# Patient Record
Sex: FEMALE | Race: WHITE | NOT HISPANIC OR LATINO | URBAN - METROPOLITAN AREA
[De-identification: names, ages, dates, MRNs, and addresses within clinical notes are randomized per-mention and may not be internally consistent; named-entity substitution may affect disease eponyms.]

---

## 2018-06-28 ENCOUNTER — EMERGENCY (EMERGENCY)
Facility: HOSPITAL | Age: 28
LOS: 1 days | Discharge: HOME | End: 2018-06-28
Attending: PERSONAL EMERGENCY RESPONSE ATTENDANT
Payer: MEDICAID

## 2018-06-28 VITALS
OXYGEN SATURATION: 100 % | TEMPERATURE: 98 F | HEART RATE: 55 BPM | DIASTOLIC BLOOD PRESSURE: 65 MMHG | SYSTOLIC BLOOD PRESSURE: 100 MMHG | RESPIRATION RATE: 16 BRPM

## 2018-06-28 PROCEDURE — 93010 ELECTROCARDIOGRAM REPORT: CPT

## 2018-06-28 PROCEDURE — 99285 EMERGENCY DEPT VISIT HI MDM: CPT | Mod: 25

## 2018-06-28 PROCEDURE — 71046 X-RAY EXAM CHEST 2 VIEWS: CPT | Mod: 26

## 2018-06-28 RX ORDER — IBUPROFEN 200 MG
600 TABLET ORAL ONCE
Qty: 0 | Refills: 0 | Status: COMPLETED | OUTPATIENT
Start: 2018-06-28 | End: 2018-06-28

## 2018-06-28 NOTE — ED PROVIDER NOTE - MUSCULOSKELETAL, MLM
no midline spine tenderness, point tender R paraspinal muscle about T5/6, no other rib tenderness, pain increased with straight leg raise R side. 5/5 strength upper and lower extremities

## 2018-06-28 NOTE — ED PROVIDER NOTE - CARE PLAN
Principal Discharge DX:	Acute right-sided thoracic back pain Principal Discharge DX:	Acute right-sided thoracic back pain  Assessment and plan of treatment:	1. Stay hydrated. Ice 20mins on, 40 mins off for first 48hrs of onset of pain, after that heat in cycle: 20 mins on, 40 mins off. Avoid strenous activity, twisting and heavy lifting.  2. Take Ibuprofen 600mg every 6hrs for pain as needed- take with food. Alternate Motrin with Tylenol 1g every 6 hours.   3. Follow up with your PCP  24-48 hours. For continued pain you may also follow up with the spine center call [phone]  4. Return to ED for worsening of symptoms including fever, weakness, numbness, bowel/urine incontinence and all other concerns.

## 2018-06-28 NOTE — ED PROVIDER NOTE - PLAN OF CARE
1. Stay hydrated. Ice 20mins on, 40 mins off for first 48hrs of onset of pain, after that heat in cycle: 20 mins on, 40 mins off. Avoid strenous activity, twisting and heavy lifting.  2. Take Ibuprofen 600mg every 6hrs for pain as needed- take with food. Alternate Motrin with Tylenol 1g every 6 hours.   3. Follow up with your PCP  24-48 hours. For continued pain you may also follow up with the spine center call [phone]  4. Return to ED for worsening of symptoms including fever, weakness, numbness, bowel/urine incontinence and all other concerns.

## 2018-06-28 NOTE — ED ADULT NURSE NOTE - OBJECTIVE STATEMENT
27 year old female patient presents to ED c/o upper back pain since earlier this morning, worse with deep breathing and movement. Patient denies falls or trauma prior to noticing pain. States she lifts weights but denies recent strenuous activity and states it does not feel like any previous episodes of muscle soreness. Patient works as a  and states the pain worsened through out the day. Pt endorsing numbness to R scapula, without radiation to hand. Denies CP, SOB, dizziness or lightheadedness. Pt took neurontin 400mg PTA with some relief in pain. Family at bedside.

## 2018-06-28 NOTE — ED PROVIDER NOTE - ATTENDING CONTRIBUTION TO CARE
Attending MD Kay.  Agree with above.  PT is a 27 yr old female with pmhx of PT awoke this morning with pain in her mid upper back ~T5 which is point tender just lateral to spine on R side.  Pain worse with deep breaths and movement.  No trauma but excercises regularly.  Took flexeril at home which did improve sxs mildly.  No CP/nausea/recent fever/cough/illness.  No hx of leg swelling/clots.  Does take OCP's.  No hx of CA.  Pt is otherwise well appearing.  Lungs clear, heart sounds normal.  PT is not tachycardic.  PT is point tender T5/T6 paraspinal.  + straight R leg raise.  NO identifiable risk factors for PE. Attending MD Kay.  Agree with above.  PT is a 27 yr old female with pmhx of PT awoke this morning with pain in her mid upper back ~T5 which is point tender just lateral to spine on R side.  Pain worse with deep breaths and movement.  No trauma but excercises regularly.  Took flexeril at home which did improve sxs mildly.  No CP/nausea/recent fever/cough/illness.  No hx of leg swelling/clots.  Does take OCP's.  No hx of CA.  Pt is otherwise well appearing.  Lungs clear, heart sounds normal.  PT is not tachycardic.  PT is point tender T5/T6 paraspinal.  + straight R leg raise.  NO identifiable risk factors for PE.  D-dimer non-actionable, CXR & EKG non-actionable, pt reassured.  Stable for discharge.  Return to ED for any acutely new or worsening sxs margarito any development of chest pain, difficulty breathing or leg swelling.  None present currently.  Follow-up with PCP/ortho as needed for ongoing pain.

## 2018-06-28 NOTE — ED PROVIDER NOTE - MEDICAL DECISION MAKING DETAILS
Patient with no PMH p/w R paraspinal back pain. Pain reproduced on palpation, deep breaths and straight leg raise.  No injuries.  Wells negative, PERC does not apply as is on OCPs, however low suspicion. Low suspicion also for infection as no fever, cough. EKG, CXR, dimer, motrin, reassess.

## 2018-06-28 NOTE — ED PROVIDER NOTE - OBJECTIVE STATEMENT
Patient woke up this morning with back pain R side paraspinal about T5/6.  Thought it was due to yesterday's workout, took gabapentin that her dad had, which seemed to help somewhat.  Radiates to front. Worse with movement and deep breaths.  no falls, no fever/cough/nausea/vomiting/chest pain.  Nonsmoker, no alcohol or drug use.  Sexually active with one partner, no hx STIs.  LMP about 3 weeks ago.  no leg swelling, no immobilization, no hx blood clots or cancer.

## 2018-06-28 NOTE — ED ADULT NURSE NOTE - CHPI ED SYMPTOMS NEG
no difficulty bearing weight/no constipation/no neck tenderness/no fatigue/no motor function loss/no numbness

## 2018-06-29 LAB — D DIMER BLD IA.RAPID-MCNC: <150 NG/ML DDU — SIGNIFICANT CHANGE UP

## 2018-06-29 PROCEDURE — 85379 FIBRIN DEGRADATION QUANT: CPT

## 2018-06-29 PROCEDURE — 99283 EMERGENCY DEPT VISIT LOW MDM: CPT

## 2018-06-29 PROCEDURE — 71046 X-RAY EXAM CHEST 2 VIEWS: CPT

## 2018-06-29 PROCEDURE — 93005 ELECTROCARDIOGRAM TRACING: CPT

## 2018-06-29 RX ADMIN — Medication 600 MILLIGRAM(S): at 00:51

## 2019-11-23 ENCOUNTER — EMERGENCY (EMERGENCY)
Facility: HOSPITAL | Age: 29
LOS: 1 days | Discharge: HOME | End: 2019-11-23
Attending: EMERGENCY MEDICINE
Payer: COMMERCIAL

## 2019-11-23 VITALS
HEIGHT: 62 IN | TEMPERATURE: 98 F | HEART RATE: 56 BPM | OXYGEN SATURATION: 99 % | WEIGHT: 130.07 LBS | DIASTOLIC BLOOD PRESSURE: 76 MMHG | RESPIRATION RATE: 20 BRPM | SYSTOLIC BLOOD PRESSURE: 133 MMHG

## 2019-11-23 PROCEDURE — 99283 EMERGENCY DEPT VISIT LOW MDM: CPT

## 2019-11-23 RX ORDER — CYCLOBENZAPRINE HYDROCHLORIDE 10 MG/1
1 TABLET, FILM COATED ORAL
Qty: 6 | Refills: 0
Start: 2019-11-23 | End: 2019-11-24

## 2019-11-23 RX ORDER — IBUPROFEN 200 MG
400 TABLET ORAL ONCE
Refills: 0 | Status: COMPLETED | OUTPATIENT
Start: 2019-11-23 | End: 2019-11-23

## 2019-11-23 RX ORDER — DIAZEPAM 5 MG
5 TABLET ORAL ONCE
Refills: 0 | Status: DISCONTINUED | OUTPATIENT
Start: 2019-11-23 | End: 2019-11-23

## 2019-11-23 RX ORDER — LIDOCAINE 4 G/100G
1 CREAM TOPICAL ONCE
Refills: 0 | Status: COMPLETED | OUTPATIENT
Start: 2019-11-23 | End: 2019-11-23

## 2019-11-23 RX ADMIN — Medication 5 MILLIGRAM(S): at 18:37

## 2019-11-23 RX ADMIN — LIDOCAINE 1 PATCH: 4 CREAM TOPICAL at 18:36

## 2019-11-23 RX ADMIN — Medication 400 MILLIGRAM(S): at 18:37

## 2019-11-23 NOTE — ED PROVIDER NOTE - NS ED ROS FT
GENERAL: no fever, no chills  EYES: no change in vision, no irritation, no discharge, no redness, no pain  HEENT: no trouble swallowing or speaking, no throat pain, +neck pain  CARDIAC: no chest pain, no palpitations   PULMONARY: no cough, no shortness of breath, no wheezing  GI: no abdominal pain, no nausea, no vomiting, no diarrhea, no constipation  : no changes in urination, no dysuria  SKIN: no rashes  NEURO: no headache, no numbness, no weakness  MSK: no back pain, no calf pain     -Jorge Gagnon, PGY-1

## 2019-11-23 NOTE — ED PROVIDER NOTE - NSFOLLOWUPINSTRUCTIONS_ED_ALL_ED_FT
You were seen in the emergency department for acute neck pain. This is likely caused by a strain in the muscles of the neck, causing radiation of the pain. Take Tylenol 650mg (Two 325 mg pills) every 4-6 hours as needed for pain. Take Motrin 600 mg every 8 hours as needed for moderate pain -- take with food. You may apply a Lidoderm patch to the area for up to 12 hours - they can be purchased over the counter. You may take Flexeril as prescribed for severe muscle spasms or pain - do not operate heavy machinery while taking this medication as it can make you drowsy.    Rest, drink plenty of fluids.  Advance activity as tolerated.  Continue all previously prescribed medications as directed.  Follow up with your primary care physician in 48-72 hours- bring copies of your results.  Return to the ER for worsening or persistent symptoms, and/or ANY NEW OR CONCERNING SYMPTOMS. If you have issues obtaining follow up, please call: [phone] to obtain a doctor or specialist who takes your insurance in your area.

## 2019-11-23 NOTE — ED PROVIDER NOTE - PROGRESS NOTE DETAILS
Jorge Gagnon, PGY1: Patient seen and evaluated. Pain manageable. Plan to d/c w/ Flexeril script and PCP follow up. Discussed plan w/ patient and all questions answered, patient in agreement w/ plan. Patient given return precautions. VSS, NAD, CAOx3. Patient stable for d/c.

## 2019-11-23 NOTE — ED PROVIDER NOTE - PHYSICAL EXAMINATION
GENERAL: A&Ox3, non-toxic appearing, no acute distress  HEENT: NCAT, EOMI, oral mucosa moist, normal conjunctiva, neck supple w/ FROM  RESP: CTAB, no respiratory distress, no wheezes/rhonchi/rales, speaking in full sentences  CV: RRR, no murmurs/rubs/gallops  ABDOMEN: soft, non-tender, non-distended, no guarding  MSK: no visible deformities, right trapezius and occipital muscles TTP w/ hypertonicity  NEURO: no focal sensory or motor deficits, KOENIG, steady gait, SILT, 5/5 strength in all extremities  SKIN: warm, normal color, well perfused, no rash  PSYCH: normal affect    -Jorge Gagnon, PGY-1 GENERAL: A&Ox3, non-toxic appearing, no acute distress  HEENT: NCAT, EOMI, oral mucosa moist, normal conjunctiva, neck supple w/ FROM  RESP: CTAB, no respiratory distress, no wheezes/rhonchi/rales, speaking in full sentences  CV: RRR, no murmurs/rubs/gallops  ABDOMEN: soft, non-tender, non-distended, no guarding  MSK: no visible deformities, right trapezius and occipital muscles TTP w/ hypertonicity  NEURO: no focal sensory or motor deficits, KOENIG, steady gait, SILT, 5/5 strength in all extremities  SKIN: warm, normal color, well perfused, no rash  PSYCH: normal affect  -Jorge Gagnon, PGY-1        Attending note. Patient is alert and in mild to moderate distress. ENT examination is normal. There is no cervical or submandibular adenopathy. There is no midline cervical tenderness. Patient has tenderness in the right paracervical and right trapezius muscles. Symptoms are exacerbated by flexion, extension and combined extension and rotation. Her strength is 5/5 equal symmetrical. Sensation is intact and normal.

## 2019-11-23 NOTE — ED PROVIDER NOTE - OBJECTIVE STATEMENT
29 year old female no PMH p/w neck pain. Patient states she woke up yesterday w/ the pain, located on right side of neck, radiates into the back of her head. Exacerbated by forward neck flexion, mildly relieved w/ Tylenol. Similar hx for past 2 years, pain typically resolves on its own. Patient had MRI 2 years ago, dx w/ vertigo, not prescribed medications. Patient concerned since pain lasted 2 days. Patient is a heavy , unsure if she could have strained a muscle during workout. Denies recent trauma, no sick contacts. No fever, chills, headache, visual changes, jaw pain, chest pain, shortness of breath, weakness, or numbness/tingling. 29 year old female no PMH p/w neck pain. Patient states she woke up yesterday w/ the pain, located on right side of neck, radiates into the back of her head. Exacerbated by forward neck flexion, mildly relieved w/ Tylenol. Similar hx for past 2 years, pain typically resolves on its own. Patient had MRI 2 years ago, dx w/ vertigo, not prescribed medications. Patient concerned since pain lasted 2 days. Patient is a heavy , unsure if she could have strained a muscle during workout. Denies recent trauma, no sick contacts. No fever, chills, headache, visual changes, jaw pain, chest pain, shortness of breath, weakness, or numbness/tingling.       Attending note. Patient was seen in fast track room #2. We will do both. Patient is complaining of atraumatic right sided neck pain radiates from the shoulder to the right occipital area. She denies any fevers or chills or neurologic symptoms. She has no ENT complaints. She had similar episodes in the past which took last only a few hours. Pain is exacerbated by neck movement. She has no numbness or paresthesia.

## 2019-11-23 NOTE — ED PROVIDER NOTE - CLINICAL SUMMARY MEDICAL DECISION MAKING FREE TEXT BOX
29 year old female no PMH p/w right-sided neck pain upon waking yesterday, atraumatic, radiates into occiput, similar hx for past 2 years but usually remits w/ Tylenol. No red flag sx, possibly 2/2 heavy weightlifting. Right trapezius and occipital muscles TTP w/ hypertonicity, likely MSK causing pain. Neck supple, doubt meningitis. Plan for lidoderm patch, Valium, reassess. Can send w/ Flexeril script. Likely d/c home w/ PCP follow up. 29 year old female no PMH p/w right-sided neck pain upon waking yesterday, atraumatic, radiates into occiput, similar hx for past 2 years but usually remits w/ Tylenol. No red flag sx, possibly 2/2 heavy weightlifting. Right trapezius and occipital muscles TTP w/ hypertonicity, likely MSK causing pain. Neck supple, doubt meningitis. Plan for lidoderm patch, Valium, reassess. Can send w/ Flexeril script. Likely d/c home w/ PCP follow up.      Attending note. Atraumatic neck pain on the right side. NSAIDs, muscle relaxants, followup with spine center

## 2019-11-23 NOTE — ED ADULT NURSE NOTE - OBJECTIVE STATEMENT
pt ambulatory to room 2 c/o L sided neck pain pt ambulatory to room 2 c/o L sided neck pain worse with flexion  pt denies numbness or tingling. pt med as orderd.

## 2019-11-23 NOTE — ED PROVIDER NOTE - PATIENT PORTAL LINK FT
You can access the FollowMyHealth Patient Portal offered by [facility] by registering at the following website: [email or web address]. By joining [facility] [facility] [employer]’s FollowMyHealth portal, you will also be able to view your health information using other applications (apps) compatible with our system.

## 2019-11-23 NOTE — ED PROVIDER NOTE - ATTENDING CONTRIBUTION TO CARE
I performed a history and physical exam of the patient and discussed their management with the resident/ACP. I reviewed the resident/ACP's note and agree with the documented findings and plan of care.  attn =see MDM

## 2021-08-29 ENCOUNTER — EMERGENCY (EMERGENCY)
Facility: HOSPITAL | Age: 31
LOS: 1 days | Discharge: HOME | End: 2021-08-29
Attending: STUDENT IN AN ORGANIZED HEALTH CARE EDUCATION/TRAINING PROGRAM
Payer: MEDICAID

## 2021-08-29 VITALS
SYSTOLIC BLOOD PRESSURE: 124 MMHG | HEART RATE: 63 BPM | TEMPERATURE: 98 F | OXYGEN SATURATION: 98 % | WEIGHT: 138.89 LBS | HEIGHT: 62 IN | RESPIRATION RATE: 18 BRPM | DIASTOLIC BLOOD PRESSURE: 77 MMHG

## 2021-08-29 PROCEDURE — 72220 X-RAY EXAM SACRUM TAILBONE: CPT

## 2021-08-29 PROCEDURE — 99284 EMERGENCY DEPT VISIT MOD MDM: CPT

## 2021-08-29 PROCEDURE — 72220 X-RAY EXAM SACRUM TAILBONE: CPT | Mod: 26

## 2021-08-29 PROCEDURE — 72100 X-RAY EXAM L-S SPINE 2/3 VWS: CPT | Mod: 26

## 2021-08-29 PROCEDURE — 72170 X-RAY EXAM OF PELVIS: CPT | Mod: 26

## 2021-08-29 PROCEDURE — 99284 EMERGENCY DEPT VISIT MOD MDM: CPT | Mod: 25

## 2021-08-29 PROCEDURE — 72170 X-RAY EXAM OF PELVIS: CPT

## 2021-08-29 PROCEDURE — 72100 X-RAY EXAM L-S SPINE 2/3 VWS: CPT

## 2021-08-29 RX ORDER — IBUPROFEN 200 MG
600 TABLET ORAL ONCE
Refills: 0 | Status: COMPLETED | OUTPATIENT
Start: 2021-08-29 | End: 2021-08-29

## 2021-08-29 RX ORDER — OXYCODONE HYDROCHLORIDE 5 MG/1
5 TABLET ORAL ONCE
Refills: 0 | Status: DISCONTINUED | OUTPATIENT
Start: 2021-08-29 | End: 2021-08-29

## 2021-08-29 RX ORDER — OXYCODONE HYDROCHLORIDE 5 MG/1
1 TABLET ORAL
Qty: 8 | Refills: 0
Start: 2021-08-29 | End: 2021-08-30

## 2021-08-29 RX ORDER — ACETAMINOPHEN 500 MG
975 TABLET ORAL ONCE
Refills: 0 | Status: COMPLETED | OUTPATIENT
Start: 2021-08-29 | End: 2021-08-29

## 2021-08-29 RX ADMIN — Medication 600 MILLIGRAM(S): at 17:21

## 2021-08-29 RX ADMIN — Medication 975 MILLIGRAM(S): at 17:21

## 2021-08-29 RX ADMIN — OXYCODONE HYDROCHLORIDE 5 MILLIGRAM(S): 5 TABLET ORAL at 17:20

## 2021-08-29 NOTE — ED PROVIDER NOTE - CARE PROVIDER_API CALL
Km Lacy (MD)  Emergency Medicine; Family Medicine; Sports Medicine  [address] [place] [address]  Phone: [phone]  Fax: [phone]  Follow Up Time: 7-10 Days

## 2021-08-29 NOTE — ED PROVIDER NOTE - OBJECTIVE STATEMENT
Attending/MD Coby. 29 yo F with no PMH, p/w fall, slipped on the steps, and fell onto the buthock, around 9a. Pt denies head injury or loc. No difficulty walking but because progressively worsening back (bottom) pain, pt came into the ED for further evaluation. Pt went to the bathroom after the injury, no blood with defication or urination. No blood from the vagina. LMP was 1wk ago, normal flow, denies pregnancy or sexual activity. Pt noted pain on the batthoc but denies other symptoms.

## 2021-08-29 NOTE — ED PROVIDER NOTE - NSFOLLOWUPCLINICS_GEN_ALL_ED_FT
[facility]  Orthopedic Surgery  [address] [address]  Phone: [phone]  Fax:   Follow Up Time: 7-10 Days

## 2021-08-29 NOTE — ED PROVIDER NOTE - CLINICAL SUMMARY MEDICAL DECISION MAKING FREE TEXT BOX
Attending/MD Coby. 31 yo F, p/w back pain, lower back, buthoc, no neurological or urinary symptoms, pt walks by herself, father is at the bedside, very concerning for spinal injury, will start xray but given her symptoms, very low concern for spinal cord injury or spinal gilmer injury, may have tail bone injury or fx but pt denies any anal or vaginal bleed or pain. pain meds, xrays, ucg.

## 2021-08-29 NOTE — ED PROVIDER NOTE - PATIENT PORTAL LINK FT
You can access the FollowMyHealth Patient Portal offered by [facility] by registering at the following website: [email or web address]. By joining [facility]’s FollowMyHealth portal, you will also be able to view your health information using other applications (apps) compatible with our system.

## 2021-08-29 NOTE — ED ADULT TRIAGE NOTE - CHIEF COMPLAINT QUOTE
Pt presents to ED with c/o fall down a few stairs.  Pt landed on lumbar spine and went down stairs.  Lower back pain

## 2021-08-29 NOTE — ED PROVIDER NOTE - PHYSICAL EXAMINATION
Attending/MD Coby.   VITALS: reviewed  GEN: No apparent distress, A & O x 4  HEAD/EYES: NC/AT, anicteric sclerae, no conjunctival pallor  ENT: mucus membranes moist, neck is supple  RESP: lungs CTA with equal breath sounds bilaterally, chest wall nontender and atraumatic  CV: heart with reg rhythm S1, S2, no murmur; distal pulses intact and symmetric bilaterally  ABDOMEN: normoactive bowel sounds, soft, nondistended, nontender  SKIN: warm, dry, no rash, no bruising, no cyanosis.  NEURO: alert, mentating appropriately, no facial asymmetry.  PSYCH: Affect appropriate  MSK/Neuro:  the neck has no midline tenderness, deformity, or stepoff, and is ranged painlessly. extremities atraumatic and nontender, no edema, no asymmetry. the back is with midline tenderness, there is no spinal deformity or stepoff and the back is ranged but limited due to the pain. ++ localized tenderness over the tailbone, S5 area  Right: (-)SLR, (-)cross SLR. muscle strenght 5/5 in quadriceps, 5/5 in knee extension, 5/5 in knee extension, 5/5 dorse flaxion, 5/5 plantar flexion, DTR +1 knee and +1 achilless. Sensation L1-5, S1 intact.   Left: Right: (-)SLR, (-)cross SLR. muscle strenght 5/5 in quadriceps, 5/5 in knee extension, 5/5 in knee extension, 5/5 dorse flaxion, 5/5 plantar flexion, DTR +1 knee and +1 achilless. Sensation L1-5, S1 intact.

## 2021-08-29 NOTE — ED ADULT NURSE NOTE - OBJECTIVE STATEMENT
30y female A&Ox3 no pmhx presenting to ED complaining of back pain. pt states she slipped on steps and fell on her back around 9am today. pt has no difficulty walking but has pain so decided to get an evaluation. pt denies LOC, hitting head, no active bleeding or urinary symptoms.

## 2021-08-29 NOTE — ED PROVIDER NOTE - NSFOLLOWUPINSTRUCTIONS_ED_ALL_ED_FT
1) Take 400-600mg Ibuprofen (also called Advil or Mortrin) every 6 hours as needed for fever/pain/discomfort. You can take this with Tylenol 650-1000 mg (also called acetaminophen) every 6 hours. You can take these medications 3 hrs apart in order to have a layered effect. Don't use more than 4000 mg of Tylenol in any 24-hour period. Make sure your other prescription/over-the-counter medications don't contain any Tylenol so you don't take too much. If you have any stomach discomfort while taking Motrin, you can use TUMS or Pepcid or Zantac (these can all be bought without a prescription). Please do not take these medications if you do no have pain or if you have any history of bleeding disorders, kidney or liver disease. Do not use ibuprofen if you are on blood thinners (anti-coagulation).  2) If your pain is NOT well controlled with these medications, take pain medication as prescribed.  3) Drink at least 2 Liters or 64 Ounces of water each day.    Please take Oxycodone every 6 hours, as needed for SEVERE pain. Don't exceed 4 tabs per day. Please do not drive or operate heavy [place] while on this medication. May cause drowsiness.  Alcohol may intensify this effect.  Caution federal law prohibits the transfer of this drug to any person other  than the person for whom it was prescribed.  This prescription cannot be refilled.  This product contains acetaminophen.  Do not use  with any other product containing acetaminophen to prevent possible liver damage.    Back Pain    Back pain is very common in adults. The cause of back pain is rarely dangerous and the pain often gets better over time. The cause of your back pain may not be known and may include strain of muscles or ligaments, degeneration of the spinal disks, or arthritis. Occasionally the pain may radiate down your leg(s). Over-the-counter medicines to reduce pain and inflammation are often the most helpful. Stretching and remaining active frequently helps the healing process.     SEEK IMMEDIATE MEDICAL CARE IF YOU HAVE ANY OF THE FOLLOWING SYMPTOMS: bowel or bladder control problems, unusual weakness or numbness in your arms or legs, nausea or vomiting, abdominal pain, fever, dizziness/lightheadedness.

## 2021-10-20 NOTE — ED ADULT TRIAGE NOTE - CCCP TRG CHIEF CMPLNT
1. Have you been to the ER, urgent care clinic since your last visit? Hospitalized since your last visit? No    2. Have you seen or consulted any other health care providers outside of the [address] [place] [address] since your last visit? Include any pap smears or colon screening.  No back pain/fall

## 2024-03-29 NOTE — ED ADULT TRIAGE NOTE - BP NONINVASIVE SYSTOLIC (MM HG)
Moderation is quintana and the healthy approach!  Make decisions that are not based ONLY on emotions- they will be unbalanced  Tolerating  Rescuing behavior can become very maladaptive  You are important enough to ask for what you need!  Watch for the comparison  Follow up as scheduled   
100

## 2024-06-12 NOTE — ED ADULT NURSE NOTE - DISCHARGE DATE/TIME
Procedure: EGD/Colonoscopy  Date: 10/08/2024  Physician performing: Dr. Barrera  Location of procedure:  [place]  Instructions given to patient: Yes  Diabetic: No  Clearances: N/A   
29-Jun-2018 00:54